# Patient Record
Sex: FEMALE | Race: BLACK OR AFRICAN AMERICAN | NOT HISPANIC OR LATINO | Employment: STUDENT | URBAN - METROPOLITAN AREA
[De-identification: names, ages, dates, MRNs, and addresses within clinical notes are randomized per-mention and may not be internally consistent; named-entity substitution may affect disease eponyms.]

---
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
## 2024-03-19 NOTE — PLAN OF CARE
Problem: Occupational Therapy  Goal: Occupational Therapy Goal  Description: Goals to be met by: 8/9    Patient will increase functional independence with ADLs by performing:    LE Dressing with Modified [place].  Grooming while standing at sink with Modified [place].  Toileting from toilet with Modified [place] for hygiene and clothing management.   Toilet transfer to toilet with Modified [place].    Outcome: Ongoing, Progressing      Chief Complaint:   Chief Complaint   Patient presents with    Consultation       HISTORY OF PRESENT ILLNESS     The patient is here to establish care for tachypalpitations.  Last week Wednesday, he started noticing increased palpitations which he describes as a skipped beat.  He took a recording of his rhythm on his Apple watch and brought it to me for review.  Since then, episodes have decreased significantly on their own.  Denies any prior history of heart rhythm abnormalities.  Has good exercise tolerance and exercises twice a week at the gym.    Past medical history includes diabetes, anxiety    Family history:  Father had a heart attack and  at the age of 60.  No family history of heart rhythm problems.      Social history:  Drinks up to three cups of regular coffee in the morning.  Denies smoking.  No energy drinks.  No significant alcohol intake.      PHYSICAL EXAM     Vitals:  Visit Vitals  /82   Pulse 76   Ht 5' 10\" (1.778 m)   Wt 99.8 kg (220 lb)   BMI 31.57 kg/m²       Appears his stated age sitting comfortably in no apparent distress.  Eyes:  No xanthelasma   Neck:  supple  Cardiovascular: Regular rate and rhythm.  Normal S1 and S2.  No S3, S4 or murmurs.    Lungs:  Clear to auscultation bilaterally without crackles or wheeze   Extremities:  No edema.    Psychiatric:  Alert, oriented to time place and person.  Neuro: Nonfocal.     Rhythm strip:  Sinus rhythm.  I reviewed the patient's Apple watch tracings and sinus rhythm with frequent PACs and an isolated PVC was noted.    LABORATORY DATA     Sodium (mmol/L)   Date Value   2024 142     Potassium (mmol/L)   Date Value   2024 4.1     Chloride (mmol/L)   Date Value   2024 106     Glucose (mg/dL)   Date Value   2024 134 (H)     Calcium (mg/dL)   Date Value   2024 9.1     Carbon Dioxide (mmol/L)   Date Value   2024 27     BUN (mg/dL)   Date Value   2024 10     Creatinine (mg/dL)   Date Value   2024  0.71     No results found for: \"GFRNA\"  WBC (K/mcL)   Date Value   03/14/2024 8.2     RBC (mil/mcL)   Date Value   03/14/2024 5.05     HCT (%)   Date Value   03/14/2024 42.0     HGB (g/dL)   Date Value   03/14/2024 14.4     PLT (K/mcL)   Date Value   03/14/2024 287     TSH (mcUnits/mL)   Date Value   03/14/2024 1.378     DIAGNOSTIC TESTING     ECG 3/14/24      ASSESSMENT/PLAN     1. Symptomatic PACs: Aside from a moderate caffeine intake, no significant triggers identified.  Patient advised to cut down caffeine intake.  Will obtain echocardiogram to rule out significant structural heart disease.  As long as no significant abnormalities noted on echo, would recommend clinical observation it however, if symptoms become lifestyle limiting, consider low-dose beta-blocker therapy.  Discussed at length with the patient.    2. Diabetes:  On lisinopril for nephro protective effect.  3. Normal renal function    We will communicate the echocardiogram results to the patient.  He will let us know if he wishes to consider pharmacologic therapy.  Pros and cons were reviewed with him.    On 3/19/2024, Floresita COVARRUBIAS SCRIBE scribed the services personally performed by Jorge Aldridge MD.      The documentation recorded by the scribe accurately and completely reflects the service(s) I personally performed and the decisions made by me.

## 2024-10-03 ENCOUNTER — HOSPITAL ENCOUNTER (EMERGENCY)
Facility: HOSPITAL | Age: 19
Discharge: HOME | End: 2024-10-03
Attending: EMERGENCY MEDICINE
Payer: COMMERCIAL

## 2024-10-03 VITALS
DIASTOLIC BLOOD PRESSURE: 87 MMHG | SYSTOLIC BLOOD PRESSURE: 120 MMHG | WEIGHT: 115 LBS | OXYGEN SATURATION: 100 % | BODY MASS INDEX: 20.38 KG/M2 | HEART RATE: 81 BPM | RESPIRATION RATE: 16 BRPM | HEIGHT: 63 IN | TEMPERATURE: 99 F

## 2024-10-03 DIAGNOSIS — M25.561 ACUTE PAIN OF RIGHT KNEE: ICD-10-CM

## 2024-10-03 DIAGNOSIS — S39.012A STRAIN OF LUMBAR REGION, INITIAL ENCOUNTER: Primary | ICD-10-CM
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
## 2024-10-03 LAB — B-HCG UR QL: NEGATIVE

## 2024-10-03 PROCEDURE — 99285 EMERGENCY DEPT VISIT HI MDM: CPT | Mod: 25

## 2024-10-03 PROCEDURE — 81025 URINE PREGNANCY TEST: CPT | Performed by: EMERGENCY MEDICINE

## 2024-10-03 RX ORDER — KETOROLAC TROMETHAMINE 30 MG/ML
30 INJECTION, SOLUTION INTRAMUSCULAR; INTRAVENOUS
Status: DISCONTINUED | OUTPATIENT
Start: 2024-10-03 | End: 2024-10-03 | Stop reason: HOSPADM

## 2024-10-03 RX ORDER — METHOCARBAMOL 750 MG/1
750 TABLET, FILM COATED ORAL 3 TIMES DAILY
Qty: 15 TABLET | Refills: 0 | Status: SHIPPED | OUTPATIENT
Start: 2024-10-03 | End: 2024-10-08
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
## 2025-07-29 ENCOUNTER — HOSPITAL ENCOUNTER (EMERGENCY)
Facility: HOSPITAL | Age: 20
Discharge: HOME | End: 2025-07-29
Attending: EMERGENCY MEDICINE

## 2025-07-29 VITALS
RESPIRATION RATE: 20 BRPM | HEIGHT: 64 IN | DIASTOLIC BLOOD PRESSURE: 87 MMHG | OXYGEN SATURATION: 97 % | WEIGHT: 118 LBS | BODY MASS INDEX: 20.14 KG/M2 | TEMPERATURE: 98 F | HEART RATE: 86 BPM | SYSTOLIC BLOOD PRESSURE: 130 MMHG

## 2025-07-29 DIAGNOSIS — N76.0 ACUTE VAGINITIS: Primary | ICD-10-CM

## 2025-07-29 LAB
B-HCG UR QL: NEGATIVE
BACTERIA #/AREA URNS AUTO: NORMAL /HPF
BILIRUB UR QL STRIP.AUTO: NEGATIVE
C TRACH DNA SPEC QL NAA+PROBE: DETECTED
CLARITY UR: CLEAR
CLUE CELLS VAG QL WET PREP: ABNORMAL
CLUE CELLS VAG QL WET PREP: NORMAL
COLOR UR AUTO: ABNORMAL
GLUCOSE UR QL STRIP: NEGATIVE
HGB UR QL STRIP: NEGATIVE
KETONES UR QL STRIP: NEGATIVE
LEUKOCYTE ESTERASE UR QL STRIP: ABNORMAL
N GONORRHOEA DNA SPEC QL NAA+PROBE: NOT DETECTED
NITRITE UR QL STRIP: NEGATIVE
PH UR STRIP: 7 [PH]
PROT UR QL STRIP: NEGATIVE
RBC #/AREA URNS AUTO: NORMAL /HPF
SP GR UR STRIP.AUTO: 1.01 (ref 1–1.03)
SPECIMEN SOURCE: ABNORMAL
SQUAMOUS #/AREA URNS AUTO: NORMAL /HPF
T VAGINALIS VAG QL WET PREP: ABNORMAL
T VAGINALIS VAG QL WET PREP: NORMAL
UROBILINOGEN UR STRIP-ACNC: 0.2
WBC #/AREA URNS AUTO: NORMAL /HPF
WBC #/AREA VAG WET PREP: ABNORMAL
WBC #/AREA VAG WET PREP: NORMAL
YEAST SPEC QL WET PREP: ABNORMAL
YEAST SPEC QL WET PREP: NORMAL

## 2025-07-29 PROCEDURE — 81025 URINE PREGNANCY TEST: CPT

## 2025-07-29 PROCEDURE — 81003 URINALYSIS AUTO W/O SCOPE: CPT

## 2025-07-29 PROCEDURE — 99283 EMERGENCY DEPT VISIT LOW MDM: CPT

## 2025-07-29 PROCEDURE — 87491 CHLMYD TRACH DNA AMP PROBE: CPT

## 2025-07-29 PROCEDURE — 87210 SMEAR WET MOUNT SALINE/INK: CPT

## 2025-07-29 RX ORDER — DOXYCYCLINE 100 MG/1
100 CAPSULE ORAL EVERY 12 HOURS
Qty: 14 CAPSULE | Refills: 0 | Status: SHIPPED | OUTPATIENT
Start: 2025-07-29 | End: 2025-08-05

## 2025-07-29 NOTE — Clinical Note
"Oscar Juan" Adalberto was seen and treated in our emergency department on 7/29/2025.  She may return to work on 07/30/2025.       If you have any questions or concerns, please don't hesitate to call.       RN    "

## 2025-07-29 NOTE — ED PROVIDER NOTES
Encounter Date: 7/29/2025       History     Chief Complaint   Patient presents with    Vaginal Pain     Pt c/o having vaginal pain and burning onset two days ago, along with whitish vaginal discharge.     20 y.o.   female presents to Emergency Department with a chief complaint of vaginal discharge. Symptoms began two days ago and have been constant since onset. Reports moderate, white, vaginal discharge. Associated symptoms include vaginal irritation and itching. Denies recent unprotected sex. Reports recent change in detergent. The patient denies CP, SOB, fever, abdominal pain, pelvic pain, or vomiting. No other reported symptoms at this time      The history is provided by the patient. No  was used.   Vaginal Discharge  This is a new problem. The current episode started 2 days ago. The problem occurs constantly. The problem has not changed since onset.Pertinent negatives include no chest pain, no abdominal pain, no headaches and no shortness of breath. She has tried nothing for the symptoms.     Review of patient's allergies indicates:  No Known Allergies  History reviewed. No pertinent past medical history.  Past Surgical History:   Procedure Laterality Date    INTRAMEDULLARY RODDING OF FEMUR Left 7/24/2022    Procedure: INSERTION, INTRAMEDULLARY OMAR, FEMUR;  Surgeon: Frank Ramirez MD;  Location: [facility];  Service: Orthopedics;  Laterality: Left;    IRRIGATION AND DEBRIDEMENT OF LOWER EXTREMITY Left 7/24/2022    Procedure: IRRIGATION AND DEBRIDEMENT, LOWER EXTREMITY;  Surgeon: Frank Ramirez MD;  Location: [facility];  Service: Orthopedics;  Laterality: Left;     No family history on file.  Social History[1]  Review of Systems   Constitutional:  Negative for diaphoresis, fatigue and fever.   Eyes:  Negative for photophobia and visual disturbance.   Respiratory:  Negative for cough, shortness of breath, wheezing and stridor.    Cardiovascular:  Negative for chest pain and leg  swelling.   Gastrointestinal:  Negative for abdominal pain, nausea and vomiting.   Genitourinary:  Positive for vaginal discharge. Negative for dysuria, enuresis, flank pain and vaginal bleeding.        Vaginal irritation    Neurological:  Negative for dizziness, syncope, weakness and headaches.   All other systems reviewed and are negative.      Physical Exam     Initial Vitals [07/29/25 1259]   BP Pulse Resp Temp SpO2   130/87 86 20 98.1 °F (36.7 °C) 97 %      MAP       --         Physical Exam    Nursing note and vitals reviewed.  Constitutional: She appears well-developed and well-nourished. She is not diaphoretic. She is cooperative.  Non-toxic appearance. No distress.   HENT:   Head: Normocephalic and atraumatic.   Right Ear: External ear normal.   Left Ear: External ear normal.   Nose: Nose normal.   Eyes: Conjunctivae and EOM are normal. Pupils are equal, round, and reactive to light.   Neck: Neck supple. No thyromegaly present. No tracheal deviation present.   Normal range of motion.  Cardiovascular:  Normal pulses.           Pulmonary/Chest: Effort normal. No accessory muscle usage or stridor. No tachypnea and no bradypnea. No respiratory distress.   Genitourinary:    Pelvic exam was performed with patient supine.   There is no rash, tenderness or lesion on the right labia. There is no rash, tenderness or lesion on the left labia. Cervix exhibits no motion tenderness and no friability. Right adnexum displays no tenderness. Left adnexum displays no tenderness.    Vaginal discharge present.      No vaginal erythema, tenderness or bleeding.   No erythema, tenderness or bleeding in the vagina.    Genitourinary Comments: Moderate, white, vaginal discharge on exam. Pelvic exam performed with ROSCOE Chavez as chaperone.      Musculoskeletal:         General: Normal range of motion.      Cervical back: Normal range of motion and neck supple.     Neurological: She is alert and oriented to person, place, and time. She  has normal strength. No sensory deficit. GCS score is 15. GCS eye subscore is 4. GCS verbal subscore is 5. GCS motor subscore is 6.   Skin: Skin is warm and dry. Capillary refill takes less than 2 seconds. No erythema.   Psychiatric: She has a normal mood and affect. Thought content normal.         ED Course   Procedures  Labs Reviewed   WET PREP, GENITAL - Abnormal       Result Value    WBC, Wet Prep Many (*)     Clue Cells, Wet Prep None Seen      Trichomonas, Wet Prep None Seen      Yeast, Wet Prep None Seen     URINALYSIS, REFLEX TO URINE CULTURE - Abnormal    Color, UA Straw      Appearance, UA Clear      Specific Gravity, UA 1.010      pH, UA 7.0      Protein, UA Negative      Glucose, UA Negative      Ketones, UA Negative      Blood, UA Negative      Bilirubin, UA Negative      Urobilinogen, UA 0.2      Nitrites, UA Negative      Leukocyte Esterase, UA Large (*)    WET PREP, GENITAL - Normal    WBC, Wet Prep None Seen      Clue Cells, Wet Prep None Seen      Trichomonas, Wet Prep None Seen      Yeast, Wet Prep None Seen     PREGNANCY TEST, URINE RAPID - Normal    hCG Qualitative, Urine Negative     URINALYSIS, MICROSCOPIC - Normal    Bacteria, UA Rare      RBC, UA None Seen      WBC, UA 3-5      Squamous Epithelial Cells, UA Rare     CHLAMYDIA/GONORRHOEAE(GC), PCR          Imaging Results    None          Medications - No data to display  Medical Decision Making  Patient awake, alert, has non-labored breathing, and follows commands appropriately. Arrived to ED due to vaginal discharge, vaginal irritation, and vaginal itching. Symptoms began two days ago. Afebrile. Denies unprotected sex. NAD Noted.     Judging by the patient's chief complaint and pertinent history, the patient has the following possible differential diagnoses, including but not limited to the following: Vaginal Discharge, UTI, BV, Vaginitis     Some of these are deemed to be lower likelihood and some more likely based on my physical exam and  history combined with possible lab work and/or imaging studies. Please see the pertinent studies, and refer to the HPI. Some of these diagnoses will take further evaluation to fully rule out, perhaps as an outpatient and the patient was encouraged to follow up when discharged for more comprehensive evaluation.       Amount and/or Complexity of Data Reviewed  Labs: ordered. Decision-making details documented in ED Course.     Details: UA unremarkable. UPT negative. Pending GC/C. Informed patient of results.   Discussion of management or test interpretation with external provider(s): Discussed plan of care and interventions with patient. Agreed to and aware of plan of care. Comfortable being discharged home. Patient discharged home. Patient denies new or additional complaints; no further tests indicated at this time. Verbalized understanding of instructions. No emergent or apparent distress noted prior to discharge. Strict ER return precautions given.       Risk  OTC drugs.  Prescription drug management.               ED Course as of 07/29/25 1733   Tue Jul 29, 2025   1335 hCG Qualitative, Urine: Negative [JA]   1335 Leukocyte Esterase, UA(!): Large [JA]   1348 WBC - Vaginal Screen: None Seen [JA]   1348 Clue Cells, Wet Prep: None Seen [JA]   1348 Trichomonas, Wet Prep: None Seen [JA]   1348 Yeast, Wet Prep: None Seen [JA]   1351 Bacteria, UA: Rare [JA]   1351 RBC, UA: None Seen [JA]   1351 WBC, UA: 3-5 [JA]   1403 Wet prep repeated after pelvic exam performed by myself. Initial wet prep collected by patient. Pending repeat results.  [JA]   1422 WBC - Vaginal Screen(!): Many [JA]   1423 Clue Cells, Wet Prep: None Seen [JA]   1423 Trichomonas, Wet Prep: None Seen [JA]   1423 Yeast, Wet Prep: None Seen [JA]   1425 Discussed results with patient. Workup essentially unremarkable. Pending GC/C. Patient reports she recently changed detergents, this could be contributing to symptoms. Instructed to start using previous  detergent and to f/u with health unit for further testing. At disposition, patient has no additional complaints, non-toxic in appearance, has no abdominal pain, and has outpatient f/u. Stable for [place] home.  [JA]      ED Course User Index  [JA] Maru Stephens NP                               Clinical Impression:  Final diagnoses:  [N76.0] Acute vaginitis (Primary)          ED Disposition Condition    Discharge Good          ED Prescriptions    None       Follow-up Information       Follow up With Specialties Details Why Contact Info    Lyla Bhagat FNP Family Medicine Schedule an appointment as soon as possible for a visit in 1 day  [address] [facility] [address] [phone]      [facility] - Emergency Dept Emergency Medicine Go to  If symptoms worsen, As needed [id number]  [facility] [address] [phone]    Follow up with health unit in 1 day.                       [1]   Social History  Tobacco Use    Smoking status: Never    Smokeless tobacco: Never   Substance Use Topics    Alcohol use: Never    Drug use: Never        Maru Stephens NP  07/29/25 0736

## 2025-07-30 NOTE — PROVIDER PROGRESS NOTES - EMERGENCY DEPT.
Encounter Date: 7/29/2025    ED Physician Progress Notes        Physician Note:   Called and verified patient's information with patient and informed her of Chlamydia results. Prescribed Doxycyline BID x7 days and to f/u with health unit for outpatient testing. No questions.

## (undated) DEVICE — DRAPE INCISE IOBAN 2 23X23IN

## (undated) DEVICE — TAPE SILK 3IN

## (undated) DEVICE — Device

## (undated) DEVICE — SUT VICRYL BR 1 GEN 27 CT-1

## (undated) DEVICE — APPLICATOR CHLORAPREP ORN 26ML

## (undated) DEVICE — WIRE GUIDE 3.2MM 400MM
Type: IMPLANTABLE DEVICE | Site: LEG | Status: NON-FUNCTIONAL
Removed: 2022-07-24

## (undated) DEVICE — COVER EQUIPMENT 36X25

## (undated) DEVICE — IRRIGATION SET Y-TYPE TUR/BLAD

## (undated) DEVICE — GLOVE 7.0 PROTEXIS PI BLUE

## (undated) DEVICE — DRAPE C-ARMOR EQUIPMENT COVER

## (undated) DEVICE — BIT DRILL QCK-CPLG 4.2MM

## (undated) DEVICE — DRAPE STERI U-SHAPED 47X51IN

## (undated) DEVICE — COVER FULLGUARD SHOE HIGH-TOP

## (undated) DEVICE — BIT DRILL 4.2MM 3 FLUTD 145MM

## (undated) DEVICE — SEE MEDLINE ITEM 157125

## (undated) DEVICE — SUT MONOCRYL 2-0 S UND

## (undated) DEVICE — GOWN SURGICAL XX LARGE X LONG

## (undated) DEVICE — GLOVE PROTEXIS HYDROGEL SZ7

## (undated) DEVICE — SOL NACL IRR 1000ML BTL

## (undated) DEVICE — DRESSING XEROFORM FOIL PK 1X8

## (undated) DEVICE — IMPLANTABLE DEVICE
Type: IMPLANTABLE DEVICE | Site: FEMUR | Status: NON-FUNCTIONAL
Removed: 2022-07-24

## (undated) DEVICE — BNDG COFLEX FOAM LF2 ST 4X5YD

## (undated) DEVICE — KIT SURGICAL TURNOVER

## (undated) DEVICE — DRESSING XEROFORM 1X8IN

## (undated) DEVICE — GAUZE SPONGE 4X4 12PLY

## (undated) DEVICE — TOWEL OR BLUE STRL 16X26 4/PK

## (undated) DEVICE — DRESSING ABSRBNT ISLAND 3.6X8

## (undated) DEVICE — SUT MONOCRYL 3-0 PS-2 UND

## (undated) DEVICE — ELECTRODE PATIENT RETURN DISP